# Patient Record
Sex: FEMALE
[De-identification: names, ages, dates, MRNs, and addresses within clinical notes are randomized per-mention and may not be internally consistent; named-entity substitution may affect disease eponyms.]

---

## 2018-06-13 ENCOUNTER — HOSPITAL ENCOUNTER (EMERGENCY)
Dept: HOSPITAL 31 - C.ER | Age: 38
Discharge: HOME | End: 2018-06-13
Payer: MEDICAID

## 2018-06-13 VITALS
RESPIRATION RATE: 18 BRPM | DIASTOLIC BLOOD PRESSURE: 75 MMHG | SYSTOLIC BLOOD PRESSURE: 112 MMHG | OXYGEN SATURATION: 98 % | HEART RATE: 78 BPM | TEMPERATURE: 98.7 F

## 2018-06-13 VITALS — BODY MASS INDEX: 27 KG/M2

## 2018-06-13 DIAGNOSIS — R51: ICD-10-CM

## 2018-06-13 DIAGNOSIS — R06.00: ICD-10-CM

## 2018-06-13 DIAGNOSIS — J45.909: Primary | ICD-10-CM

## 2018-06-13 LAB
ALBUMIN SERPL-MCNC: 4.4 G/DL (ref 3.5–5)
ALBUMIN/GLOB SERPL: 1.3 {RATIO} (ref 1–2.1)
ALT SERPL-CCNC: 33 U/L (ref 9–52)
AST SERPL-CCNC: 27 U/L (ref 14–36)
BASOPHILS # BLD AUTO: 0.1 K/UL (ref 0–0.2)
BASOPHILS NFR BLD: 1 % (ref 0–2)
BILIRUB UR-MCNC: NEGATIVE MG/DL
BUN SERPL-MCNC: 11 MG/DL (ref 7–17)
CALCIUM SERPL-MCNC: 9.4 MG/DL (ref 8.6–10.4)
EOSINOPHIL # BLD AUTO: 0.1 K/UL (ref 0–0.7)
EOSINOPHIL NFR BLD: 2.3 % (ref 0–4)
ERYTHROCYTE [DISTWIDTH] IN BLOOD BY AUTOMATED COUNT: 13 % (ref 11.5–14.5)
GFR NON-AFRICAN AMERICAN: > 60
GLUCOSE UR STRIP-MCNC: NORMAL MG/DL
HCG,QUALITATIVE URINE: NEGATIVE
HGB BLD-MCNC: 13.4 G/DL (ref 11–16)
LEUKOCYTE ESTERASE UR-ACNC: (no result) LEU/UL
LYMPHOCYTES # BLD AUTO: 2.2 K/UL (ref 1–4.3)
LYMPHOCYTES NFR BLD AUTO: 34.5 % (ref 20–40)
MCH RBC QN AUTO: 28.9 PG (ref 27–31)
MCHC RBC AUTO-ENTMCNC: 33.4 G/DL (ref 33–37)
MCV RBC AUTO: 86.5 FL (ref 81–99)
MONOCYTES # BLD: 0.5 K/UL (ref 0–0.8)
MONOCYTES NFR BLD: 7.5 % (ref 0–10)
NEUTROPHILS # BLD: 3.5 K/UL (ref 1.8–7)
NEUTROPHILS NFR BLD AUTO: 54.7 % (ref 50–75)
NRBC BLD AUTO-RTO: 0.1 % (ref 0–2)
PH UR STRIP: 7 [PH] (ref 5–8)
PLATELET # BLD: 243 K/UL (ref 130–400)
PMV BLD AUTO: 9.6 FL (ref 7.2–11.7)
PROT UR STRIP-MCNC: NEGATIVE MG/DL
RBC # BLD AUTO: 4.66 MIL/UL (ref 3.8–5.2)
RBC # UR STRIP: (no result) /UL
SP GR UR STRIP: 1.01 (ref 1–1.03)
SQUAMOUS EPITHIAL: 1 /HPF (ref 0–5)
UROBILINOGEN UR-MCNC: NORMAL MG/DL (ref 0.2–1)
WBC # BLD AUTO: 6.5 K/UL (ref 4.8–10.8)

## 2018-06-13 PROCEDURE — 84484 ASSAY OF TROPONIN QUANT: CPT

## 2018-06-13 PROCEDURE — 85025 COMPLETE CBC W/AUTO DIFF WBC: CPT

## 2018-06-13 PROCEDURE — 93005 ELECTROCARDIOGRAM TRACING: CPT

## 2018-06-13 PROCEDURE — 99284 EMERGENCY DEPT VISIT MOD MDM: CPT

## 2018-06-13 PROCEDURE — 96361 HYDRATE IV INFUSION ADD-ON: CPT

## 2018-06-13 PROCEDURE — 81001 URINALYSIS AUTO W/SCOPE: CPT

## 2018-06-13 PROCEDURE — 71045 X-RAY EXAM CHEST 1 VIEW: CPT

## 2018-06-13 PROCEDURE — 80053 COMPREHEN METABOLIC PANEL: CPT

## 2018-06-13 PROCEDURE — 84703 CHORIONIC GONADOTROPIN ASSAY: CPT

## 2018-06-13 PROCEDURE — 96374 THER/PROPH/DIAG INJ IV PUSH: CPT

## 2018-06-13 PROCEDURE — 96375 TX/PRO/DX INJ NEW DRUG ADDON: CPT

## 2018-06-13 NOTE — C.PDOC
History Of Present Illness





<Yamileth Li - Last Filed: 06/13/18 19:05>





<Mike Garcia - Last Filed: 06/13/18 19:59>


38 y/o female presents to ED with c/o sob, cp, lightheadedness, nausea and 

headache since earlier today. Patient reports photophobia and denies fever, 

trauma, back pain, dysuria, leg pain or swelling. No other complaints at this 

time.  (Yamileth Li)


History Per: Patient


History/Exam Limitations: no limitations


Onset/Duration Of Symptoms: Days


Current Symptoms Are (Timing): Still Present





<Yamileth Li - Last Filed: 06/13/18 19:05>





<Mike Garcia - Last Filed: 06/13/18 19:59>


Time Seen by Provider: 06/13/18 15:54


Chief Complaint (Nursing): Shortness Of Breath





Past Medical History


Reviewed: Historical Data, Nursing Documentation, Vital Signs





- Medical History


PMH: No Chronic Diseases


Surgical History: Cholecystectomy


Family History: States: No Known Family Hx





- Social History


Hx Alcohol Use: No


Hx Substance Use: No





- Immunization History


Hx Tetanus Toxoid Vaccination: No


Hx Influenza Vaccination: No


Hx Pneumococcal Vaccination: No





<Yamileth Li - Last Filed: 06/13/18 19:05>


Vital Signs: 





 Last Vital Signs











Temp  98.7 F   06/13/18 19:46


 


Pulse  78   06/13/18 19:46


 


Resp  18   06/13/18 19:46


 


BP  112/75   06/13/18 19:46


 


Pulse Ox  98   06/13/18 19:46














Review Of Systems


Constitutional: Negative for: Fever, Chills


Cardiovascular: Positive for: Chest Pain, Light Headedness


Respiratory: Positive for: Shortness of Breath


Gastrointestinal: Positive for: Nausea, Abdominal Pain.  Negative for: Vomiting

, Diarrhea


Genitourinary: Negative for: Dysuria


Musculoskeletal: Negative for: Back Pain


Skin: Negative for: Rash





<Yamileth Li - Last Filed: 06/13/18 19:05>





Physical Exam





- Physical Exam


Appears: Non-toxic, No Acute Distress


Skin: Warm, Dry, No Rash


Head: Atraumatic, Normacephalic


Oral Mucosa: Moist


Neck: Normal ROM, Supple


Cardiovascular: Rhythm Regular


Respiratory: Normal Breath Sounds, No Rales, No Rhonchi, No Wheezing


Gastrointestinal/Abdominal: Soft, No Tenderness, No Guarding, No Rebound


Back: No CVA Tenderness, No Paraspinal Tenderness


Neurological/Psych: Oriented x3, Normal Speech, Normal Cognition





<EmanieloYamileth SUSHIL - Last Filed: 06/13/18 19:05>





ED Course And Treatment





- Laboratory Results


Result Diagrams: 


 06/13/18 17:05





 06/13/18 17:05


O2 Sat by Pulse Oximetry: 100 (RA)


Pulse Ox Interpretation: Normal





<JenYamileth C - Last Filed: 06/13/18 19:05>





- Laboratory Results


Result Diagrams: 


 06/13/18 17:05





 06/13/18 17:05


Pulse Ox Interpretation: Normal





- Radiology


CXR: Interpreted by Me, Viewed By Me


CXR Interpretation: No: Infiltrates, Fracture, Pnemothorax


Progress Note: pt feels fine. no more headache or shortness of breath.  

Speaking in complete sentences


Reevaluation Time: 19:52


Reassessment Condition: Improved





<Mike Garcia - Last Filed: 06/13/18 19:59>





Disposition





- Disposition


Disposition Time: 19:06





<JenYamileth C - Last Filed: 06/13/18 19:05>


Counseled Patient/Family Regarding: Studies Performed, Diagnosis, Need For 

Followup, Rx Given





<RadhaEditameño - Last Filed: 06/13/18 19:59>





- Disposition


Referrals: 


Pam Munoz [Medical Doctor] - 


Disposition: HOME/ ROUTINE


Condition: FAIR


Additional Instructions: 


Please return if symptoms recur


Prescriptions: 


Albuterol HFA [Ventolin HFA 90 mcg/actuation (8 g)] 2 puff IH [id number] PRN #1 

puff


 PRN Reason: Shortness Of Breath


Naproxen [Naprosyn] 1 tab PO BID PRN #25 tab


 PRN Reason: Pain


Ondansetron ODT [Zofran ODT] 1 odt PO BID PRN #6 odt


 PRN Reason: Nausea/Vomiting


Instructions:  Headache, Adult (DC)


Forms:  [facility] (English)





- Clinical Impression


Clinical Impression: 


 Headache, Dyspnea, Reactive airway disease








- PA / NP / Resident Statement


MD/DO has reviewed & agrees with the documentation as recorded.





- Scribe Statement


The provider has reviewed the documentation as recorded by the Scribe





<Yamileth Li - Last Filed: 06/13/18 19:05>





<Mike Garcia - Last Filed: 06/13/18 19:59>





- Scribe Statement


Luis Valentin





All medical record entries made by the Scribe were at my direction and 

personally dictated by me. I have reviewed the chart and agree that the record 

accurately reflects my personal performance of the history, physical exam, 

medical decision making, and the department course for this patient. I have 

also personally directed, reviewed, and agree with the discharge instructions 

and disposition. (Yamileth Li)





Physician Patient Turnover


Patient Signed Over To: Mike Garcia


Handoff Comments: Pending re-eval and disposition





<Yamileth Li - Last Filed: 06/13/18 19:05>

## 2018-06-14 NOTE — RAD
Chest x-ray single frontal view 



History: Chest pain. 



Comparison: None available. 



Findings 



No focal infiltrate or effusion. 



Heart size within normal limits. 



Bibasilar breast and nipple shadows. 



Impression: 



No focal infiltrate or effusion.

## 2018-06-14 NOTE — CARD
--------------- APPROVED REPORT --------------





EKG Measurement

Heart [id number]

IN [id number]

[id number]

[id number]

[id number]



<Conclusion>

Normal sinus rhythm

Normal ECG

## 2018-09-02 ENCOUNTER — HOSPITAL ENCOUNTER (INPATIENT)
Dept: HOSPITAL 31 - C.ER | Age: 38
LOS: 3 days | Discharge: HOME | DRG: 143 | End: 2018-09-05
Attending: INTERNAL MEDICINE | Admitting: INTERNAL MEDICINE
Payer: MEDICAID

## 2018-09-02 VITALS — BODY MASS INDEX: 27 KG/M2

## 2018-09-02 DIAGNOSIS — R07.89: Primary | ICD-10-CM

## 2018-09-02 DIAGNOSIS — I65.29: ICD-10-CM

## 2018-09-02 DIAGNOSIS — I10: ICD-10-CM

## 2018-09-02 DIAGNOSIS — J45.909: ICD-10-CM

## 2018-09-02 LAB
ALBUMIN SERPL-MCNC: 4.4 G/DL (ref 3.5–5)
ALBUMIN/GLOB SERPL: 1.4 {RATIO} (ref 1–2.1)
ALT SERPL-CCNC: 33 U/L (ref 9–52)
APTT BLD: 32 SECONDS (ref 21–34)
AST SERPL-CCNC: 24 U/L (ref 14–36)
BASOPHILS # BLD AUTO: 0 K/UL (ref 0–0.2)
BASOPHILS NFR BLD: 0.8 % (ref 0–2)
BILIRUB UR-MCNC: NEGATIVE MG/DL
BNP SERPL-MCNC: 63.3 PG/ML (ref 0–450)
BUN SERPL-MCNC: 13 MG/DL (ref 7–17)
CALCIUM SERPL-MCNC: 9.4 MG/DL (ref 8.6–10.4)
CK MB SERPL-MCNC: 0.34 NG/ML (ref 0–3.38)
EOSINOPHIL # BLD AUTO: 0.1 K/UL (ref 0–0.7)
EOSINOPHIL NFR BLD: 1.9 % (ref 0–4)
ERYTHROCYTE [DISTWIDTH] IN BLOOD BY AUTOMATED COUNT: 13.2 % (ref 11.5–14.5)
GFR NON-AFRICAN AMERICAN: > 60
GLUCOSE UR STRIP-MCNC: NORMAL MG/DL
HCG,QUALITATIVE URINE: NEGATIVE
HGB BLD-MCNC: 13.4 G/DL (ref 11–16)
INR PPP: 1.1
LEUKOCYTE ESTERASE UR-ACNC: (no result) LEU/UL
LYMPHOCYTES # BLD AUTO: 1.8 K/UL (ref 1–4.3)
LYMPHOCYTES NFR BLD AUTO: 32.8 % (ref 20–40)
MCH RBC QN AUTO: 29.2 PG (ref 27–31)
MCHC RBC AUTO-ENTMCNC: 33.8 G/DL (ref 33–37)
MCV RBC AUTO: 86.3 FL (ref 81–99)
MONOCYTES # BLD: 0.4 K/UL (ref 0–0.8)
MONOCYTES NFR BLD: 8.3 % (ref 0–10)
NEUTROPHILS # BLD: 3 K/UL (ref 1.8–7)
NEUTROPHILS NFR BLD AUTO: 56.2 % (ref 50–75)
NRBC BLD AUTO-RTO: 0 % (ref 0–2)
PH UR STRIP: 7 [PH] (ref 5–8)
PLATELET # BLD: 207 K/UL (ref 130–400)
PMV BLD AUTO: 10.3 FL (ref 7.2–11.7)
PROT UR STRIP-MCNC: NEGATIVE MG/DL
PROTHROMBIN TIME: 11.7 SECONDS (ref 9.7–12.2)
RBC # BLD AUTO: 4.61 MIL/UL (ref 3.8–5.2)
RBC # UR STRIP: (no result) /UL
SP GR UR STRIP: 1 (ref 1–1.03)
SQUAMOUS EPITHIAL: 1 /HPF (ref 0–5)
UROBILINOGEN UR-MCNC: NORMAL MG/DL (ref 0.2–1)
WBC # BLD AUTO: 5.4 K/UL (ref 4.8–10.8)

## 2018-09-02 RX ADMIN — ENOXAPARIN SODIUM SCH MG: 40 INJECTION SUBCUTANEOUS at 20:59

## 2018-09-02 NOTE — C.PDOC
History Of Present Illness


38 year old female, hx of htn,  presents to the emergency department with 

complaints of chest pain and oral numbness for the last three days. She states 

that she is having difficulty breathing but denies fever.


Time Seen by Provider: 09/02/18 14:46


Chief Complaint (Nursing): Dizziness/Lightheaded


History Per: Patient


History/Exam Limitations: no limitations


Onset/Duration Of Symptoms: Days (3)


Current Symptoms Are (Timing): Still Present


Quality: "Pain", Other (numbness)





Past Medical History


Reviewed: Historical Data, Nursing Documentation, Vital Signs


Vital Signs: 


 Last Vital Signs











Temp  98.3 F   09/02/18 18:47


 


Pulse  74   09/02/18 18:47


 


Resp  20   09/02/18 18:47


 


BP  129/87   09/02/18 18:47


 


Pulse Ox  99   09/02/18 21:11














- Medical History


PMH: No Chronic Diseases


Surgical History: Cholecystectomy


Family History: States: No Known Family Hx





- Social History


Hx Alcohol Use: No


Hx Substance Use: No





- Immunization History


Hx Tetanus Toxoid Vaccination: No


Hx Influenza Vaccination: No


Hx Pneumococcal Vaccination: No





Review Of Systems


Except As Marked, All Systems Reviewed And Found Negative.


Constitutional: Negative for: Fever


ENT: Positive for: Other (oral numbness)


Cardiovascular: Positive for: Chest Pain


Respiratory: Positive for: Other (dyspnea)





Physical Exam





- Physical Exam


Appears: Non-toxic, No Acute Distress, Other (anxious)


Skin: Warm, Dry


Head: Atraumatic, Normacephalic


Eye(s): bilateral: Normal Inspection


Oral Mucosa: Moist


Tongue: Normal Appearing


Throat: Normal, No Erythema, No Exudate


Neck: Normal, Supple


Chest: Symmetrical


Cardiovascular: Rhythm Regular


Respiratory: Normal Breath Sounds, No Rales, No Rhonchi, No Wheezing


Gastrointestinal/Abdominal: Soft, No Tenderness, No Guarding, No Rebound


Extremity: Normal ROM


Neurological/Psych: Oriented x3, Normal Speech, Normal Cognition





ED Course And Treatment





- Laboratory Results


Result Diagrams: 


 09/02/18 15:10





 09/02/18 15:10


ECG: Interpreted By Me, Viewed By Me


ECG Rhythm: Sinus Rhythm (85)


Interpretation Of ECG: Normal sinus rhythm at 85bpm, no ST/T wave changes.


O2 Sat by Pulse Oximetry: 99 (RA)


Pulse Ox Interpretation: Normal





Medical Decision Making


Medical Decision Making: 


cp r/o acs vs anxiety





Plan:


EKG


BNP


CMP


Troponin I


CBC


PTT


Prothrombin Time


CXR


Ativan


HCG Qualitative Urine


Urinalysis





labs neg, ekg no changes. pt and family at bedside request overnight obs. case 

discussed with dr miller. accepts for obs. 





Disposition





- Disposition


Disposition: HOSPITALIZED


Disposition Time: 06:00


Condition: STABLE





- Clinical Impression


Clinical Impression: 


 Chest pain








- Scribe Statement


The provider has reviewed the documentation as recorded by the Scribe (Pete Kee)


All medical record entries made by the Scribe were at my direction and 

personally dictated by me. I have reviewed the chart and agree that the record 

accurately reflects my personal performance of the history, physical exam, 

medical decision making, and the department course for this patient. I have 

also personally directed, reviewed, and agree with the discharge instructions 

and disposition.

## 2018-09-02 NOTE — RAD
Date of service: 



09/02/2018



PROCEDURE:  CHEST RADIOGRAPH, 1 VIEW



HISTORY:

chest pain



COMPARISON:

Comparison chest 06/13/2018



FINDINGS:



LUNGS:

Poor inspiration with low lung volumes, crowded bronchovascular 

markings and minor bibasilar atelectasis.



PLEURA:

No pneumothorax or pleural fluid seen.



CARDIOVASCULAR:

Normal.



OSSEOUS STRUCTURES:

No significant abnormalities.



VISUALIZED UPPER ABDOMEN:

Normal.



OTHER FINDINGS:

None. 



IMPRESSION:

Poor inspiration with low lung volumes, crowded bronchovascular 

markings and minor bibasilar atelectasis.

## 2018-09-02 NOTE — CP.PCM.HP
History of Present Illness





- History of Present Illness


History of Present Illness: 


38-year-old female with PMH of HTN comes to ED with complaints of chest pain 

and oral numbness since 3 days.  She reports of having difficulty breathing.  

Denies fever, chills, abdominal pain, nausea, vomiting, dizziness, tingling or 

numbness anywhere else








Present on Admission





- Present on Admission


Any Indicators Present on Admission: No





Past Patient History





- Past Social History


Smoking Status: Never Smoked





- GASTROINTESTINAL


Hx Gastrointestinal Disorders: Yes


Other/Comment: Hernia





- PSYCHIATRIC


Hx Substance Use: No





- SURGICAL HISTORY


Hx Cholecystectomy: Yes





- ANESTHESIA


Hx Anesthesia: Yes


Hx Anesthesia Reactions: No





Meds


Home Medications: 


 Home Medication List











 Medication  Instructions  Recorded  Confirmed  Type


 


Clopidogrel [Plavix] 75 mg PO DAILY #30 tab 09/05/18  Rx











Allergies/Adverse Reactions: 


 Allergies











Allergy/AdvReac Type Severity Reaction Status Date / Time


 


codeine Allergy  SHORTNESS Verified 09/02/18 14:33





   OF BREATH  














Physical Exam





- Constitutional


Appears: Well





- Head Exam


Head Exam: ATRAUMATIC, NORMAL INSPECTION, NORMOCEPHALIC





- Eye Exam


Eye Exam: EOMI, Normal appearance, PERRL


Pupil Exam: NORMAL ACCOMODATION, PERRL





- ENT Exam


ENT Exam: Mucous Membranes Moist, Normal Exam





- Neck Exam


Neck exam: Positive for: Normal Inspection





- Respiratory Exam


Respiratory Exam: Decreased Breath Sounds





- Cardiovascular Exam


Cardiovascular Exam: REGULAR RHYTHM, +S1, +S2





- GI/Abdominal Exam


GI & Abdominal Exam: Diminished Bowel Sounds, Soft





- Rectal Exam


Rectal Exam: Deferred





Results





- Vital Signs


Recent Vital Signs: 





 Last Vital Signs











Temp  98.3 F   09/02/18 18:47


 


Pulse  74   09/02/18 18:47


 


Resp  20   09/02/18 18:47


 


BP  129/87   09/02/18 18:47


 


Pulse Ox  99   09/02/18 18:47














- Labs


Result Diagrams: 


 09/05/18 06:38





 09/05/18 06:38


Labs: 





 Laboratory Results - last 24 hr











  09/02/18 09/02/18 09/02/18





  15:10 15:10 15:10


 


WBC  5.4  


 


RBC  4.61  


 


Hgb  13.4  


 


Hct  39.8  


 


MCV  86.3  


 


MCH  29.2  


 


MCHC  33.8  


 


RDW  13.2  


 


Plt Count  207  


 


MPV  10.3  


 


Neut % (Auto)  56.2  


 


Lymph % (Auto)  32.8  


 


Mono % (Auto)  8.3  


 


Eos % (Auto)  1.9  


 


Baso % (Auto)  0.8  


 


Neut # (Auto)  3.0  


 


Lymph # (Auto)  1.8  


 


Mono # (Auto)  0.4  


 


Eos # (Auto)  0.1  


 


Baso # (Auto)  0.0  


 


PT    11.7


 


INR    1.1


 


APTT    32


 


Sodium   141 


 


Potassium   3.8 


 


Chloride   103 


 


Carbon Dioxide   26 


 


Anion Gap   17 


 


BUN   13 


 


Creatinine   0.7 


 


Est GFR ( Amer)   > 60 


 


Est GFR (Non-Af Amer)   > 60 


 


Random Glucose   104 


 


Calcium   9.4 


 


Total Bilirubin   0.6 


 


AST   24 


 


ALT   33 


 


Alkaline Phosphatase   50 


 


Troponin I   < 0.0120 


 


NT-Pro-B Natriuret Pep   63.3 


 


Total Protein   7.4 


 


Albumin   4.4 


 


Globulin   3.0 


 


Albumin/Globulin Ratio   1.4 


 


Urine Color   


 


Urine Clarity   


 


Urine pH   


 


Ur Specific Gravity   


 


Urine Protein   


 


Urine Glucose (UA)   


 


Urine Ketones   


 


Urine Blood   


 


Urine Nitrate   


 


Urine Bilirubin   


 


Urine Urobilinogen   


 


Ur Leukocyte Esterase   


 


Urine WBC (Auto)   


 


Urine RBC (Auto)   


 


Ur Squamous Epith Cells   


 


Urine HCG, Qual   














  09/02/18





  16:08


 


WBC 


 


RBC 


 


Hgb 


 


Hct 


 


MCV 


 


MCH 


 


MCHC 


 


RDW 


 


Plt Count 


 


MPV 


 


Neut % (Auto) 


 


Lymph % (Auto) 


 


Mono % (Auto) 


 


Eos % (Auto) 


 


Baso % (Auto) 


 


Neut # (Auto) 


 


Lymph # (Auto) 


 


Mono # (Auto) 


 


Eos # (Auto) 


 


Baso # (Auto) 


 


PT 


 


INR 


 


APTT 


 


Sodium 


 


Potassium 


 


Chloride 


 


Carbon Dioxide 


 


Anion Gap 


 


BUN 


 


Creatinine 


 


Est GFR ( Amer) 


 


Est GFR (Non-Af Amer) 


 


Random Glucose 


 


Calcium 


 


Total Bilirubin 


 


AST 


 


ALT 


 


Alkaline Phosphatase 


 


Troponin I 


 


NT-Pro-B Natriuret Pep 


 


Total Protein 


 


Albumin 


 


Globulin 


 


Albumin/Globulin Ratio 


 


Urine Color  Straw


 


Urine Clarity  Clear


 


Urine pH  7.0


 


Ur Specific Gravity  1.005


 


Urine Protein  Negative


 


Urine Glucose (UA)  Normal


 


Urine Ketones  Negative


 


Urine Blood  1+ H


 


Urine Nitrate  Negative


 


Urine Bilirubin  Negative


 


Urine Urobilinogen  Normal


 


Ur Leukocyte Esterase  Neg


 


Urine WBC (Auto)  < 1


 


Urine RBC (Auto)  1


 


Ur Squamous Epith Cells  1


 


Urine HCG, Qual  Negative

## 2018-09-03 LAB
CK MB SERPL-MCNC: < 0.22 NG/ML (ref 0–3.38)
CK MB SERPL-MCNC: < 0.22 NG/ML (ref 0–3.38)
HDLC SERPL-MCNC: 50 MG/DL (ref 30–70)
LDLC SERPL-MCNC: 74 MG/DL (ref 0–129)
T4 FREE SERPL-MCNC: 1.24 NG/DL (ref 0.78–2.19)

## 2018-09-03 RX ADMIN — ENOXAPARIN SODIUM SCH MG: 40 INJECTION SUBCUTANEOUS at 09:24

## 2018-09-03 NOTE — CP.PCM.PN
Subjective





- Date & Time of Evaluation


Date of Evaluation: 09/03/18


Time of Evaluation: 10:30





- Subjective


Subjective: 


clinically same





Objective





- Vital Signs/Intake and Output


Vital Signs (last 24 hours): 


 











Temp Pulse Resp BP Pulse Ox


 


 98.3 F   65   20   116/77   98 


 


 09/03/18 15:00  09/03/18 15:00  09/03/18 15:00  09/03/18 15:00  09/03/18 15:00











- Medications


Medications: 


 Current Medications





Albuterol/Ipratropium (Duoneb 3 Mg/0.5 Mg (3 Ml) Ud)  3 ml INH Q6 PRN


   PRN Reason: Shortness of Breath


Aspirin (Aspirin)  81 mg PO DAILY [facility]


   Last Admin: 09/03/18 11:20 Dose:  Not Given


Clopidogrel Bisulfate (Plavix)  75 mg PO DAILY [facility]


   Last Admin: 09/03/18 09:24 Dose:  75 mg


Enoxaparin Sodium (Lovenox)  40 mg SC DAILY [facility]


   Last Admin: 09/03/18 09:24 Dose:  40 mg


Famotidine (Pepcid)  20 mg PO BID [facility]


   Last Admin: 09/03/18 17:49 Dose:  20 mg


Naproxen (Anaprox)  275 mg PO BID PRN


   PRN Reason: pain


Ondansetron HCl (Zofran Tab)  4 mg PO BID PRN


   PRN Reason: Nausea/Vomiting


Pneumococcal Polyvalent Vaccine (Pneumovax 23 Vaccine)  0.5 ml IM .ONCE ONE


   Stop: 09/04/18 10:01











- Labs


Labs: 


 





 09/02/18 15:10 





 09/02/18 15:10 





 











PT  11.7 SECONDS (9.7-12.2)   09/02/18  15:10    


 


INR  1.1   09/02/18  15:10    


 


APTT  32 SECONDS (21-34)   09/02/18  15:10    














- Constitutional


Appears: Well





- Head Exam


Head Exam: ATRAUMATIC, NORMAL INSPECTION, NORMOCEPHALIC





- Eye Exam


Eye Exam: EOMI, Normal appearance, PERRL


Pupil Exam: NORMAL ACCOMODATION, PERRL





- ENT Exam


ENT Exam: Mucous Membranes Moist, Normal Exam





- Neck Exam


Neck Exam: Full ROM, Normal Inspection.  absent: Lymphadenopathy





- Respiratory Exam


Respiratory Exam: Decreased Breath Sounds





- Cardiovascular Exam


Cardiovascular Exam: REGULAR RHYTHM, +S1, +S2





- GI/Abdominal Exam


GI & Abdominal Exam: Soft, Diminished Bowel Sounds





- Rectal Exam


Rectal Exam: Deferred





Assessment and Plan





- Assessment and Plan (Free Text)


Plan: 


Patient seen and examined bedside


Neuro consult done, appreciated


Stroke prophylaxis


MRI of brain


Doppler ultrasound of carotid


Ankle


EKG showing normal rhythm


EEG


Cardiology reference done help appreciated

## 2018-09-03 NOTE — CP.PCM.CON
History of Present Illness





- History of Present Illness


History of Present Illness: 





I was asked to see patient by Dr SPEEDY Arnold.





patient is a 38 year old female who presents with chest pain, then developed 

headache and dizziness.  She also developed tongue numbness.  The symptoms were 

intermittent and progressive occurring at rest.  She also felt weakness in the 

left arm.





Review of Systems





- Constitutional


Constitutional: absent: As Per HPI, Anorexia, Chills, Daytime Sleepiness, 

Excessive Sweating, Fatigue, Fever, Frequent Falls, Headache, Increased Appetite

, Lethargy, Malaise, Night Sweats, Snoring, Sleep Apnea, Weight Gain, Weight 

Loss, Weakness, Other





- EENT


Eyes: absent: As Per HPI, Blind Spots, Blurred Vision, Change in Vision, 

Diplopia, Discharge, Dry Eye, Exophthalmos, Floaters, Irritation, Itchy Eyes, 

Loss of Peripheral Vision, Pain, Photophobia, Requires Corrective Lenses, Sees 

Flashes, Spots in Vision, Tunnel Vision, Other Visual Disturbances, Loss of 

Vision, Other


Ears: absent: As Per HPI, Decreased Hearing, Ear Discharge, Ear Pain, Tinnitus, 

Abnormal Hearing, Disequilibrium, Dizziness, Other


Nose/Mouth/Throat: absent: As Per HPI, Epistaxis, Nasal Congestion, Nasal 

Discharge, Nasal Obstruction, Nasal Trauma, Nose Pain, Post Nasal Drip, Sinus 

Pain, Sinus Pressure, Bleeding Gums, Change in Voice, Dental Pain, Dry Mouth, 

Dysphagia, Halitosis, Hoarsness, Lip Swelling, Mouth Lesions, Mouth Pain, 

Odynophagia, Sore Throat, Throat Swelling, Tongue Swelling, Facial Pain, Neck 

Pain, Neck Mass, Other





- Cardiovascular


Cardiovascular: Chest Pain





- Respiratory


Respiratory: absent: As Per HPI, Cough, Dyspnea, Hemoptysis, Dyspnea on Exertion

, Wheezing, Snoring, Stridor, Pain on Inspiration, Chest Congestion, Excessive 

Mucous Production, Change in Mucous Color, Pain with Coughing, Other





- Gastrointestinal


Gastrointestinal: absent: As Per HPI, Abdominal Pain, Belching, Bloating, 

Change in Bowel Habits, Change in Stool Character, Coffee Ground Emesis, 

Constipation, Cramping, Diarrhea, Dyspepsia, Dysphagia, Early Satiety, 

Excessive Flatus, Fecal Incontinence, Heartburn, Hematemesis, Hematochezia, 

Loose Stools, Melena, Nausea, Odynophagia, Temesmus, Vomiting, Other





- Genitourinary


Genitourinary: absent: As Per HPI, Change in Urinary Stream, Difficulty 

Urinating, Dysuria, Flank Pain, Hematuria, Pyuria, Nocturia, Urinary 

Incontinence, Urinary Frequency, Urinary Hesitance, Urinary Urgency, Voiding 

Freq/Small Amts, Freq UTI, Hx Renal/Bladder Calculi, Hx /Renal Surgery, 

Bladder Distension, Other





- Menstruation


Menstruation: absent: As Per HPI, Amenorrhea, Amenorrhea/Birth Control, 

Currently Menstual, Cycle <21 Days, Cycle >35 Days, Cycle Variable, Menses 1-7 

Days, Menses >/= 8 Days, Menses Variable, Cycle > 4 Weeks Between, No Menses 

for 6 Months, Heavy Menses, Light Menses, Normal Menses, Spotting Between Cycles

, S/P Hysterectomy, Menopausal, Post Menopausal, Premenarche, Abnormal Vaginal 

Bleeding, Dysmenorrhea, Other





- Musculoskeletal


Musculoskeletal: absent: As Per HPI, Abnormal Gait, Arthralgias, Atrophy, Back 

Pain, Deformity, Joint Swelling, Limited Range of Motion, Loss of Height, 

Muscle Cramps, Muscle Weakness, Myalgias, Neck Pain, Numbness, Radiating Pain 

into Limb, Stiffness, Tingling, Other





- Integumentary


Integumentary: absent: As Per HPI, Acne, Alopecia, Bleeding Lesions, Change in 

Hair, Change in Nails, Change in Pigmentation, Changing Lesions, Dry Skin, 

Erythema, Furuncle, Hirsutism, Lesions, New Lesions, Non-Healing Lesions, 

Photosensitivity, Pruritus, Rash, Skin Pain, Skin Ulcer, Sores, Striae, Swelling

, Unusual Bruising, Wounds, Jaundice, Other





- Neurological


Neurological: Disequilibrium, Dizziness, Headaches





- Psychiatric


Psychiatric: absent: As Per HPI, Abnormal Sleep Pattern, Anhedonia, Anxiety, 

Auditory Hallucinations, Behavioral Changes, Change in Appetite, Change in 

Libido, Confusion, Depression, Difficulty Concentrating, Hallucinations, 

Homicidal Ideation, Hopelessness, Irritability, Memory Loss, Mood Swings, Panic 

Attacks, Paranoia, Suicidal Ideation, Visual Hallucinations, Tactile 

Hallucinations, Other





- Endocrine


Endocrine: absent: As Per HPI, Change in Body Appearance, Change in Libido, 

Cold Intolorance, Deepening of Voice, Excessive Sweating, Fatigue, Flushing, 

Heat Intolorance, Increase in Ring/Shoe/Hat Size, Palpitations, Polydipsia, 

Polyphagia, Polyuria, Other





- Hematologic/Lymphatic


Hematologic: absent: As Per HPI, Easy Bleeding, Easy Bruising, Lymphadenopathy, 

Other





Past Patient History





- Past Social History


Smoking Status: Never Smoked





- GASTROINTESTINAL


Hx Gastrointestinal Disorders: Yes


Other/Comment: Hernia





- PSYCHIATRIC


Hx Substance Use: No





- SURGICAL HISTORY


Hx Cholecystectomy: Yes





- ANESTHESIA


Hx Anesthesia: Yes


Hx Anesthesia Reactions: No





Meds


Allergies/Adverse Reactions: 


 Allergies











Allergy/AdvReac Type Severity Reaction Status Date / Time


 


codeine Allergy  SHORTNESS Verified 09/02/18 14:33





   OF BREATH  














- Medications


Medications: 


 Current Medications





Albuterol/Ipratropium (Duoneb 3 Mg/0.5 Mg (3 Ml) Ud)  3 ml INH Q6 PRN


   PRN Reason: Shortness of Breath


Aspirin (Aspirin)  81 mg PO DAILY [facility]


Clopidogrel Bisulfate (Plavix)  75 mg PO DAILY [facility]


   Last Admin: 09/03/18 09:24 Dose:  75 mg


Enoxaparin Sodium (Lovenox)  40 mg SC DAILY [facility]


   Last Admin: 09/03/18 09:24 Dose:  40 mg


Naproxen (Anaprox)  275 mg PO BID PRN


   PRN Reason: pain


Ondansetron HCl (Zofran Tab)  4 mg PO BID PRN


   PRN Reason: Nausea/Vomiting


Pantoprazole Sodium (Protonix Ec Tab)  40 mg PO Q24H [facility]


   Last Admin: 09/03/18 08:39 Dose:  40 mg


Pneumococcal Polyvalent Vaccine (Pneumovax 23 Vaccine)  0.5 ml IM .ONCE ONE


   Stop: 09/04/18 10:01











Physical Exam





- Constitutional


Appears: Non-toxic





- Head Exam


Head Exam: NORMAL INSPECTION





- Eye Exam


Eye Exam: Normal appearance





- ENT Exam


ENT Exam: Mucous Membranes Moist





- Neck Exam


Neck exam: Positive for: Full Rom





- Respiratory Exam


Respiratory Exam: NORMAL BREATHING PATTERN





- Cardiovascular Exam


Cardiovascular Exam: REGULAR RHYTHM





- GI/Abdominal Exam


GI & Abdominal Exam: Normal Bowel Sounds





- Rectal Exam


Rectal Exam: Deferred





- Extremities Exam


Extremities exam: Negative for: pedal edema





- Back Exam


Back exam: NORMAL INSPECTION





- Neurological Exam


Neurological exam: Alert, Oriented x3





- Psychiatric Exam


Psychiatric exam: Normal Affect





- Skin


Skin Exam: Normal Color





Results





- Vital Signs


Recent Vital Signs: 


 Last Vital Signs











Temp  98.1 F   09/03/18 07:00


 


Pulse  68   09/03/18 07:35


 


Resp  20   09/03/18 07:00


 


BP  124/85   09/03/18 07:00


 


Pulse Ox  100   09/03/18 07:00














- Labs


Result Diagrams: 


 09/02/18 15:10





 09/02/18 15:10


Labs: 


 Laboratory Results - last 24 hr











  09/02/18 09/02/18 09/02/18





  15:10 15:10 15:10


 


WBC  5.4  


 


RBC  4.61  


 


Hgb  13.4  


 


Hct  39.8  


 


MCV  86.3  


 


MCH  29.2  


 


MCHC  33.8  


 


RDW  13.2  


 


Plt Count  207  


 


MPV  10.3  


 


Neut % (Auto)  56.2  


 


Lymph % (Auto)  32.8  


 


Mono % (Auto)  8.3  


 


Eos % (Auto)  1.9  


 


Baso % (Auto)  0.8  


 


Neut # (Auto)  3.0  


 


Lymph # (Auto)  1.8  


 


Mono # (Auto)  0.4  


 


Eos # (Auto)  0.1  


 


Baso # (Auto)  0.0  


 


PT    11.7


 


INR    1.1


 


APTT    32


 


Sodium   141 


 


Potassium   3.8 


 


Chloride   103 


 


Carbon Dioxide   26 


 


Anion Gap   17 


 


BUN   13 


 


Creatinine   0.7 


 


Est GFR ( Amer)   > 60 


 


Est GFR (Non-Af Amer)   > 60 


 


Random Glucose   104 


 


Calcium   9.4 


 


Total Bilirubin   0.6 


 


AST   24 


 


ALT   33 


 


Alkaline Phosphatase   50 


 


Total Creatine Kinase   


 


CK-MB (Mass)   


 


Troponin I   < 0.0120 


 


NT-Pro-B Natriuret Pep   63.3 


 


Total Protein   7.4 


 


Albumin   4.4 


 


Globulin   3.0 


 


Albumin/Globulin Ratio   1.4 


 


Urine Color   


 


Urine Clarity   


 


Urine pH   


 


Ur Specific Gravity   


 


Urine Protein   


 


Urine Glucose (UA)   


 


Urine Ketones   


 


Urine Blood   


 


Urine Nitrate   


 


Urine Bilirubin   


 


Urine Urobilinogen   


 


Ur Leukocyte Esterase   


 


Urine WBC (Auto)   


 


Urine RBC (Auto)   


 


Ur Squamous Epith Cells   


 


Urine HCG, Qual   














  09/02/18 09/02/18 09/03/18





  16:08 23:11 07:50


 


WBC   


 


RBC   


 


Hgb   


 


Hct   


 


MCV   


 


MCH   


 


MCHC   


 


RDW   


 


Plt Count   


 


MPV   


 


Neut % (Auto)   


 


Lymph % (Auto)   


 


Mono % (Auto)   


 


Eos % (Auto)   


 


Baso % (Auto)   


 


Neut # (Auto)   


 


Lymph # (Auto)   


 


Mono # (Auto)   


 


Eos # (Auto)   


 


Baso # (Auto)   


 


PT   


 


INR   


 


APTT   


 


Sodium   


 


Potassium   


 


Chloride   


 


Carbon Dioxide   


 


Anion Gap   


 


BUN   


 


Creatinine   


 


Est GFR ( Amer)   


 


Est GFR (Non-Af Amer)   


 


Random Glucose   


 


Calcium   


 


Total Bilirubin   


 


AST   


 


ALT   


 


Alkaline Phosphatase   


 


Total Creatine Kinase   97  84


 


CK-MB (Mass)   0.34  < 0.22


 


Troponin I   < 0.0120  < 0.0120


 


NT-Pro-B Natriuret Pep   


 


Total Protein   


 


Albumin   


 


Globulin   


 


Albumin/Globulin Ratio   


 


Urine Color  Straw  


 


Urine Clarity  Clear  


 


Urine pH  7.0  


 


Ur Specific Gravity  1.005  


 


Urine Protein  Negative  


 


Urine Glucose (UA)  Normal  


 


Urine Ketones  Negative  


 


Urine Blood  1+ H  


 


Urine Nitrate  Negative  


 


Urine Bilirubin  Negative  


 


Urine Urobilinogen  Normal  


 


Ur Leukocyte Esterase  Neg  


 


Urine WBC (Auto)  < 1  


 


Urine RBC (Auto)  1  


 


Ur Squamous Epith Cells  1  


 


Urine HCG, Qual  Negative  














- EKG Data


EKG Interpreted by: Myself


EKG shows normal: Sinus rhythm





Assessment & Plan


(1) Chest pain


Assessment and Plan: 


recommend echocardiogram with bubble study.  EKG is normal sinus rhythm.  

decrease ASA to 81 mg daily


Status: Acute

## 2018-09-03 NOTE — CP.PCM.CON
History of Present Illness





- History of Present Illness


History of Present Illness: 





CONSULTATION DICTATED 


TRANSIENT RIGHT SUB CORTICAL DYSFUNCTION 


STROKE PROPHYLAXIS


MRI/CAROTID/ECHO/EEG 








Past Patient History





- Past Social History


Smoking Status: Never Smoked





- GASTROINTESTINAL


Hx Gastrointestinal Disorders: Yes


Other/Comment: Hernia





- PSYCHIATRIC


Hx Substance Use: No





- SURGICAL HISTORY


Hx Cholecystectomy: Yes





- ANESTHESIA


Hx Anesthesia: Yes


Hx Anesthesia Reactions: No





Meds


Allergies/Adverse Reactions: 


 Allergies











Allergy/AdvReac Type Severity Reaction Status Date / Time


 


codeine Allergy  SHORTNESS Verified 09/02/18 14:33





   OF BREATH  














- Medications


Medications: 


 Current Medications





Albuterol/Ipratropium (Duoneb 3 Mg/0.5 Mg (3 Ml) Ud)  3 ml INH Q6 PRN


   PRN Reason: Shortness of Breath


Aspirin (Aspirin)  81 mg PO DAILY [facility]


   Last Admin: 09/03/18 11:20 Dose:  Not Given


Clopidogrel Bisulfate (Plavix)  75 mg PO DAILY [facility]


   Last Admin: 09/03/18 09:24 Dose:  75 mg


Enoxaparin Sodium (Lovenox)  40 mg SC DAILY [facility]


   Last Admin: 09/03/18 09:24 Dose:  40 mg


Naproxen (Anaprox)  275 mg PO BID PRN


   PRN Reason: pain


Ondansetron HCl (Zofran Tab)  4 mg PO BID PRN


   PRN Reason: Nausea/Vomiting


Pneumococcal Polyvalent Vaccine (Pneumovax 23 Vaccine)  0.5 ml IM .ONCE ONE


   Stop: 09/04/18 10:01











Results





- Vital Signs


Recent Vital Signs: 


 Last Vital Signs











Temp  98.3 F   09/03/18 15:00


 


Pulse  65   09/03/18 15:00


 


Resp  20   09/03/18 15:00


 


BP  116/77   09/03/18 15:00


 


Pulse Ox  98   09/03/18 15:00














- Labs


Result Diagrams: 


 09/02/18 15:10





 09/02/18 15:10


Labs: 


 Laboratory Results - last 24 hr











  09/02/18 09/03/18 09/03/18





  23:11 07:50 14:37


 


ESR   


 


Hemoglobin A1c   


 


Total Creatine Kinase  97  84  89


 


CK-MB (Mass)  0.34  < 0.22  < 0.22


 


Troponin I  < 0.0120  < 0.0120  < 0.0120


 


Homocysteine   6.9 


 


Free T4   


 


TSH 3rd Generation   














  09/03/18 09/03/18 09/03/18





  14:37 14:37 14:37


 


ESR  4  


 


Hemoglobin A1c   4.9 


 


Total Creatine Kinase   


 


CK-MB (Mass)   


 


Troponin I   


 


Homocysteine   


 


Free T4    1.24


 


TSH 3rd Generation    0.98

## 2018-09-04 VITALS — RESPIRATION RATE: 20 BRPM

## 2018-09-04 RX ADMIN — ENOXAPARIN SODIUM SCH MG: 40 INJECTION SUBCUTANEOUS at 09:46

## 2018-09-04 NOTE — CP.PCM.PN
Subjective





- Date & Time of Evaluation


Date of Evaluation: 09/04/18


Time of Evaluation: 09:00





- Subjective


Subjective: 





Progress Note for Dr. Aurelio Arnold's Service





This is a 38 year old female with no PMHx who presented with chest pain, oral 

numbness, and left arm weakness. Patient was seen and examined at bedside. 

Patient reports all her symptoms resolved. No acute complaints at this time. 

Denied fever, chills, headache, chest pain, shortness of breath, abdominal pain

, n/v/d/c, or urinary symptoms. Denied any oral numbness. 





Objective





- Vital Signs/Intake and Output


Vital Signs (last 24 hours): 


 











Temp Pulse Resp BP Pulse Ox


 


 98.2 F   67   18   114/79   99 


 


 09/04/18 07:25  09/04/18 07:31  09/04/18 07:25  09/04/18 07:25  09/04/18 07:25











- Medications


Medications: 


 Current Medications





Albuterol/Ipratropium (Duoneb 3 Mg/0.5 Mg (3 Ml) Ud)  3 ml INH Q6 PRN


   PRN Reason: Shortness of Breath


Aspirin (Aspirin Chewable)  81 mg PO DAILY [facility]


   Last Admin: 09/04/18 11:00 Dose:  Not Given


Clopidogrel Bisulfate (Plavix)  75 mg PO DAILY [facility]


   Last Admin: 09/04/18 09:41 Dose:  75 mg


Enoxaparin Sodium (Lovenox)  40 mg SC DAILY [facility]


   Last Admin: 09/04/18 09:46 Dose:  40 mg


Famotidine (Pepcid)  20 mg PO BID [facility]


   Last Admin: 09/04/18 09:41 Dose:  20 mg


Naproxen (Anaprox)  275 mg PO BID PRN


   PRN Reason: pain


Ondansetron HCl (Zofran Tab)  4 mg PO BID PRN


   PRN Reason: Nausea/Vomiting











- Labs


Labs: 


 





 09/02/18 15:10 





 09/02/18 15:10 





 











PT  11.7 SECONDS (9.7-12.2)   09/02/18  15:10    


 


INR  1.1   09/02/18  15:10    


 


APTT  32 SECONDS (21-34)   09/02/18  15:10    














- Constitutional


Appears: No Acute Distress





- Head Exam


Head Exam: NORMAL INSPECTION, NORMOCEPHALIC





- Eye Exam


Eye Exam: EOMI, Normal appearance, PERRL


Pupil Exam: NORMAL ACCOMODATION





- ENT Exam


ENT Exam: Mucous Membranes Moist, Normal Exam





- Respiratory Exam


Respiratory Exam: Clear to Ausculation Bilateral, NORMAL BREATHING PATTERN





- Cardiovascular Exam


Cardiovascular Exam: REGULAR RHYTHM





- GI/Abdominal Exam


GI & Abdominal Exam: Soft, Normal Bowel Sounds.  absent: Distended, Tenderness





- Extremities Exam


Extremities Exam: Normal Inspection.  absent: Pedal Edema, Tenderness





- Neurological Exam


Neurological Exam: Alert, Awake, Oriented x3


Neuro motor strength exam: Left Upper Extremity: 5, Right Upper Extremity: 5, 

Left Lower Extremity: 5, Right Lower Extremity: 5





- Psychiatric Exam


Psychiatric exam: Normal Affect, Normal Mood





- Skin


Skin Exam: Dry, Intact, Normal Color, Warm





Assessment and Plan





- Assessment and Plan (Free Text)


Plan: 





Oral numbness 


TIA? Transient Right Subcortical Dysfunction?


--Neurology consulted Dr. Bates - help appreciated 





Imaging: All results pending 


- Brain MRI


- ECHO with bubble study


- Carotid Dopplers


- EEG 





Labs:


- All WNL





Medications:


- ASA, Plavix, Crestor as stroke prophylaxis 





Chest pain R/O ACS


-- Cardiology consulted - Dr. Moeller help appreciated 





Management:


- EKGs, ROMIs - normal, negative to date 


- ECHO- pending 





Prophylactic Measures:


- GI PPX: Pepcid 


- DVT PPX: SCDs, Lovenox 


- PT Eval 





Disposition: Pending workup - brain MRI, ECHO, carotid dopplers - PT eval. If 

normal, will discharge patient with instructions to follow up with Dr. Bates as 

outpatient. 





All medical management as per Dr. Aurelio Arnold.





DW Dr. Aurelio Arnold,


Dee Dee Fonseca DO, PGY2

## 2018-09-04 NOTE — CARD
--------------- APPROVED REPORT --------------





Date of service: 09/02/2018



EKG Measurement

Heart [id number]

IA [id number]

[id number]

[id number]

[id number]



<Conclusion>

Normal sinus rhythm

Normal ECG

## 2018-09-04 NOTE — PN
Copied To:  Zhao Bates MD

Attending MD:  Zhao Bates MD



DATE:  09/04/2018



TIME OF EVALUATION:  06:55 a.m.



NEUROLOGICAL PROBLEM:  Transient right subcortical dysfunction, which is

resolved at present.



PHYSICAL EXAMINATION:

VITAL SIGNS:  Blood pressure 114/77, mean arterial pressure of 89,

respiratory rate 18, temperature 98.2 with the pulse rate of 65 and

regular.



The patient slept good.  No new symptoms at all.  Whatever the symptoms

that she had at the time of admission all gone.  Chest pain is also gone. 

She feels normal.  She slept good.



Her examination, which is unchanged compared with my previous examination.



WORKUP:  Triglyceride 93, cholesterol 148, LDL 74, HDL 50.



The patient is scheduled to have MRI of the brain, carotid Doppler,

echocardiogram, and EEG.  All following workup the patient is stable, the

patient can be discharged and she will have followup visit as an

outpatient.  Continue aspirin for now for stroke prophylaxis.







__________________________________________

Zhao Bates MD



DD:  09/04/2018 7:10:09

DT:  09/04/2018 7:46:23

Job # [id number]

## 2018-09-04 NOTE — CP.PCM.PN
Subjective





- Date & Time of Evaluation


Date of Evaluation: 09/04/18


Time of Evaluation: 19:40





- Subjective


Subjective: 





echocardiogram reviewed.  normal left ventricular function and no chamber 

enlargement.


recommend continued neuro work up





Objective





- Vital Signs/Intake and Output


Vital Signs (last 24 hours): 


 











Temp Pulse Resp BP Pulse Ox


 


 98.3 F   84   20   117/76   99 


 


 09/04/18 15:02  09/04/18 15:02  09/04/18 15:02  09/04/18 15:02  09/04/18 15:02











- Medications


Medications: 


 Current Medications





Albuterol/Ipratropium (Duoneb 3 Mg/0.5 Mg (3 Ml) Ud)  3 ml INH Q6 PRN


   PRN Reason: Shortness of Breath


Aspirin (Aspirin Chewable)  81 mg PO DAILY [facility]


   Last Admin: 09/04/18 13:16 Dose:  81 mg


Clopidogrel Bisulfate (Plavix)  75 mg PO DAILY [facility]


   Last Admin: 09/04/18 09:41 Dose:  75 mg


Enoxaparin Sodium (Lovenox)  40 mg SC DAILY [facility]


   Last Admin: 09/04/18 09:46 Dose:  40 mg


Famotidine (Pepcid)  20 mg PO BID [facility]


   Last Admin: 09/04/18 17:24 Dose:  20 mg


Lorazepam (Ativan)  1 mg IVP ONCE PRN


   PRN Reason: Anxiety


Naproxen (Anaprox)  275 mg PO BID PRN


   PRN Reason: pain


Ondansetron HCl (Zofran Tab)  4 mg PO BID PRN


   PRN Reason: Nausea/Vomiting


Rosuvastatin Calcium (Crestor)  2.5 mg PO HS [facility]











- Labs


Labs: 


 





 09/02/18 15:10 





 09/02/18 15:10 





 











PT  11.7 SECONDS (9.7-12.2)   09/02/18  15:10    


 


INR  1.1   09/02/18  15:10    


 


APTT  32 SECONDS (21-34)   09/02/18  15:10    














Assessment and Plan


(1) Chest pain


Status: Acute

## 2018-09-04 NOTE — CON
Copied To:  Zhao Bates MD

Attending MD:  Zhao Bates MD



DATE:  09/03/2018



LOCATION:  Room #661, bed B.



ATTENDING PHYSICIAN:  Joe Arnold MD.



REASON FOR CONSULTATION:  Numbness of the tongue and left arm.



CHIEF COMPLAINT:  The patient was brought into [facility] after the

episode of abrupt onset of chest pain following with tongue numbness,

slurred speech, and left-sided numbness.  This happened around 12:30

midday, and the patient was transferred to the [facility] for further

evaluation.



The whole symptom was resolved in a minute or so.



HISTORY OF PRESENTING ILLNESS:  Ms. Miladis Christian is a 38-year-old

right-handed  female, usual state of health, around midday where

she was at home, abrupt onset of chest pain following with tongue numbness

and left sided face, neck, arm, and leg numbness.  She may recall her leg

is also weak at that time.  The whole episode lasted for about an hour or

so; however, her chest pain still persisted.  This episode not associating

with visual dysfunction.  No similar episodes happened in the past.  No

episode associating with these symptoms, such as tremor or seizures.



PAST MEDICAL HISTORY:  Asthma.



PERSONAL HISTORY:  Denies smoking or alcohol use.



ALLERGIES:  ALLERGIC TO CODEINE.



REVIEW OF SYSTEMS:  A 12-point system being reviewed.  From neuro, new

onset of left-sided numbness.



MEDICATIONS:  Naproxen, aspirin, Lovenox, Pepcid, Plavix, and Zofran.



PHYSICAL EXAMINATION:

VITAL SIGNS:  Blood pressure 116/77, mean arterial pressure of 90,

respiratory rate 18, temperature 98.3, and pulse rate 65 and regular.

NECK:  Supple.  No carotid bruits.

HEART:  Sounds regular.

CHEST:  Fair air entry.

EXTREMITIES:  No edema in legs.

NEUROLOGICAL:  Mental status examination, she is awake, alert, and oriented

to person, place, and time.  Speech is clear.  Naming, repetition, fluency,

and comprehension all within normal.  Cranial nerve examination, visual

field intact.  Pupils reactive to light.  Extraocular movement normal.  No

nystagmus.  No facial sensory deficit.  No facial asymmetry.  Hearing is

normal.  Tongue is midline.  Good gag.  Motor examination on outstretched

hand with eyes closed, no drift noted.  Power is symmetric on either side. 

Deep tendon reflexes, biceps, brachialis, triceps 1+; both knees are

absent; both the ankles are absent.  Plantars are downgoing.  Sensory

examination, pain and temperature decreased on the left side. 

Coordination, finger-nose-finger test is intact.  Gait is normal.



WORKUP:  Blood workup; WBC 5.4, hemoglobin 13.4, hematocrit 39.8, and

platelet 207.  PT 11.7, INR 1.1, PTT 32, sodium 141, potassium 3.8,

chloride 103, bicarbonate 26, BUN 13, GFR more than 60, hemoglobin A1c 4.9,

TSH is 0.98.  Urine shows 1+ blood.



CONCLUSION:  Ms. Miladis Christian has been presenting with new onset of

chest pain, followed with slurred speech and left-sided numbness consistent

with right subcortical dysfunction, probably thalamic region.



RECOMMENDATIONS:

1.  MRI of the brain to rule out any structural pathology to explain her

problem.

2.  Carotid Doppler to assess stenosis.

3.  Echocardiogram to assess the cardiac status for her stroke process.

4.  EEG to rule out any electrographic seizures.

5.  The patient should be on antiplatelet and lipid profile to be done and

statin should be placed for stroke prophylaxis.  The patient will be

followed closely with you.





__________________________________________

Zhao Bates MD





DD:  09/03/2018 17:42:29

DT:  09/03/2018 23:09:59

Job # [id number]

## 2018-09-05 VITALS — DIASTOLIC BLOOD PRESSURE: 75 MMHG | OXYGEN SATURATION: 98 % | TEMPERATURE: 97.9 F | SYSTOLIC BLOOD PRESSURE: 108 MMHG

## 2018-09-05 VITALS — HEART RATE: 95 BPM

## 2018-09-05 LAB
ALBUMIN SERPL-MCNC: 4 G/DL (ref 3.5–5)
ALBUMIN/GLOB SERPL: 1.4 {RATIO} (ref 1–2.1)
ALT SERPL-CCNC: 30 U/L (ref 9–52)
AST SERPL-CCNC: 19 U/L (ref 14–36)
BASOPHILS # BLD AUTO: 0 K/UL (ref 0–0.2)
BASOPHILS NFR BLD: 0.6 % (ref 0–2)
BUN SERPL-MCNC: 16 MG/DL (ref 7–17)
CALCIUM SERPL-MCNC: 9.4 MG/DL (ref 8.6–10.4)
EOSINOPHIL # BLD AUTO: 0.2 K/UL (ref 0–0.7)
EOSINOPHIL NFR BLD: 2.3 % (ref 0–4)
ERYTHROCYTE [DISTWIDTH] IN BLOOD BY AUTOMATED COUNT: 13.1 % (ref 11.5–14.5)
GFR NON-AFRICAN AMERICAN: > 60
HGB BLD-MCNC: 13.5 G/DL (ref 11–16)
LYMPHOCYTES # BLD AUTO: 2 K/UL (ref 1–4.3)
LYMPHOCYTES NFR BLD AUTO: 27.7 % (ref 20–40)
MCH RBC QN AUTO: 28.9 PG (ref 27–31)
MCHC RBC AUTO-ENTMCNC: 33.6 G/DL (ref 33–37)
MCV RBC AUTO: 86 FL (ref 81–99)
MONOCYTES # BLD: 0.7 K/UL (ref 0–0.8)
MONOCYTES NFR BLD: 9.9 % (ref 0–10)
NEUTROPHILS # BLD: 4.3 K/UL (ref 1.8–7)
NEUTROPHILS NFR BLD AUTO: 59.5 % (ref 50–75)
NRBC BLD AUTO-RTO: 0 % (ref 0–2)
PLATELET # BLD: 211 K/UL (ref 130–400)
PMV BLD AUTO: 10 FL (ref 7.2–11.7)
RBC # BLD AUTO: 4.67 MIL/UL (ref 3.8–5.2)
WBC # BLD AUTO: 7.2 K/UL (ref 4.8–10.8)

## 2018-09-05 RX ADMIN — ENOXAPARIN SODIUM SCH MG: 40 INJECTION SUBCUTANEOUS at 10:37

## 2018-09-05 NOTE — CARD
--------------- APPROVED REPORT --------------





Date of service: 09/04/2018



EXAM: Two-dimensional and M-mode echocardiogram with Doppler and 

color Doppler with bubble study



Other Information 

Quality : GoodRhythm : 



INDICATION

CVA/TIA Dizziness and Vertigo Chest Pain 



2D DIMENSIONS 

IVSd0.8   (0.7-1.1cm)Aortic Root (2D)2.8   (2.0-3.7cm)

LVDd4.4   (3.9-5.9cm)PWd0.7   (0.7-1.1cm)

LVDs2.6   (2.5-4.0cm)FS (%) 39.3   %

LVEF (%)70.1   (>50%)



M-Mode DIMENSIONS 

RVDd1.97   (2.1-3.2cm)Left Atrium (MM)3.35   (2.5-4.0cm)

IVSd0.90   (0.7-1.1cm)Aortic Root2.47   (2.2-3.7cm)

LVDd4.51   (4.0-5.6cm)Aortic Cusp Exc.1.65   (1.5-2.0cm)

PWd0.79   (0.7-1.1cm)FS (%) 30   %

LVDs3.17   (2.0-3.8cm)LVEF (%)65   (>50%)



Mitral Valve

MV E Wgfroqdt74.1cm/sMV A Somqcguq09.9cm/sE/A ratio0.9



TDI

E/Lateral E'0.0E/Medial E'0.0



Tricuspid Valve

TR Peak Vraxcpfy307pq/sTR Peak Gr.[id number]



<Conclusion>

Left ventricle: thickness: normal; size: normal; overall ejection 

fraction: 65%: 

diastolic filling pressures: normal



Mitral valve: annulus: normal: leaflets: normal: excursion: normal; 

no significant trans-mitral gradient: no significant incompetence: 

left atrium: normal

Aortic valve: leaflets:mild thickening;: excursion: normal; no 

significant trans-aortic gradient: No significant incompetence: 

aortic root: normal

Right sided Structures: Pulmonary valve: normal; no significant 

incompetence; Tricuspid valve: normal; no significant incompetence:

Intra-cardiac hemodynamics: pulmonary systolic pressures: normal; 

central venous pressures: normal

No pericardial effusion

## 2018-09-05 NOTE — CP.PCM.PN
Subjective





- Date & Time of Evaluation


Date of Evaluation: 09/05/18


Time of Evaluation: 09:00





- Subjective


Subjective: 





Progress Note for Dr. Aurelio Arnold's Service





Patient was seen and examined at bedside. Patient reports all her symptoms 

resolved. No acute complaints at this time. Denied fever, chills, headache, 

chest pain, shortness of breath, abdominal pain, n/v/d/c, or urinary symptoms. 

Denied any oral numbness. 





Objective





- Vital Signs/Intake and Output


Vital Signs (last 24 hours): 


 











Temp Pulse Resp BP Pulse Ox


 


 97.9 F   63   20   108/75   98 


 


 09/05/18 07:00  09/05/18 08:00  09/05/18 07:00  09/05/18 07:00  09/05/18 07:00








Intake and Output: 


 











 09/05/18 09/05/18





 06:59 18:59


 


Intake Total 360 


 


Balance 360 














- Medications


Medications: 


 Current Medications





Albuterol/Ipratropium (Duoneb 3 Mg/0.5 Mg (3 Ml) Ud)  3 ml INH Q6 PRN


   PRN Reason: Shortness of Breath


Aspirin (Aspirin Chewable)  81 mg PO DAILY [facility]


   Last Admin: 09/04/18 13:16 Dose:  81 mg


Clopidogrel Bisulfate (Plavix)  75 mg PO DAILY [facility]


   Last Admin: 09/04/18 09:41 Dose:  75 mg


Enoxaparin Sodium (Lovenox)  40 mg SC DAILY [facility]


   Last Admin: 09/04/18 09:46 Dose:  40 mg


Famotidine (Pepcid)  20 mg PO BID [facility]


   Last Admin: 09/04/18 17:24 Dose:  20 mg


Lorazepam (Ativan)  1 mg IVP ONCE PRN


   PRN Reason: Anxiety


   Last Admin: 09/05/18 08:14 Dose:  1 mg


Naproxen (Anaprox)  275 mg PO BID PRN


   PRN Reason: pain


Ondansetron HCl (Zofran Tab)  4 mg PO BID PRN


   PRN Reason: Nausea/Vomiting


Rosuvastatin Calcium (Crestor)  2.5 mg PO [facility]


   Last Admin: 09/04/18 21:32 Dose:  2.5 mg











- Labs


Labs: 


 





 09/05/18 06:38 





 09/05/18 06:38 





 











PT  11.7 SECONDS (9.7-12.2)   09/02/18  15:10    


 


INR  1.1   09/02/18  15:10    


 


APTT  32 SECONDS (21-34)   09/02/18  15:10    














- Additional Findings


Additional findings: 





- Constitutional


Appears: No Acute Distress





- Head Exam


Head Exam: NORMAL INSPECTION, NORMOCEPHALIC





- Eye Exam


Eye Exam: EOMI, Normal appearance, PERRL


Pupil Exam: NORMAL ACCOMODATION





- ENT Exam


ENT Exam: Mucous Membranes Moist, Normal Exam





- Respiratory Exam


Respiratory Exam: Clear to Ausculation Bilateral, NORMAL BREATHING PATTERN





- Cardiovascular Exam


Cardiovascular Exam: REGULAR RHYTHM





- GI/Abdominal Exam


GI & Abdominal Exam: Soft, Normal Bowel Sounds.  absent: Distended, Tenderness





- Extremities Exam


Extremities Exam: Normal Inspection.  absent: Pedal Edema, Tenderness





- Neurological Exam


Neurological Exam: Alert, Awake, Oriented x3


Neuro motor strength exam: Left Upper Extremity: 5, Right Upper Extremity: 5, 

Left Lower Extremity: 5, Right Lower Extremity: 5





- Psychiatric Exam


Psychiatric exam: Normal Affect, Normal Mood





- Skin


Skin Exam: Dry, Intact, Normal Color, Warm





Assessment and Plan





- Assessment and Plan (Free Text)


Plan: 





Oral numbness 


TIA? Transient Right Subcortical Dysfunction?


--Neurology consulted Dr. Bates - help appreciated 





Imaging: 


- Brain MRI - 1.1 x 0.6 cm nonspecific intra diploic lesion in the left 

parietal calvarium.  Correlation with CT scan may be helpful for further 

evaluation.


- EEG - pending 


- ECHO with bubble study - read as normal by Dr. Moeller 


- Carotid Dopplers - negative for any findings 





Labs:


- All WNL





Medications:


- ASA, Plavix, Crestor as stroke prophylaxis 


- Will not be DC with statin since LDL is <100, only plavix as per Dr. Bates. 





Chest pain R/O ACS


-- Cardiology consulted - Dr. Moeller help appreciated 





Management:


- EKGs, ROMIs - normal, negative to date 


- ECHO with bubble study - read as normal by Dr. Moeller 





Prophylactic Measures:


- GI PPX: Pepcid 


- DVT PPX: SCDs, Lovenox 


- PT Eval - home 





Disposition: Patient is stable for discharge. Please continue taking the 

following medications: Plavix 75 mg by mouth daily. Please follow up with your 

primary care physician in 1-2 weeks for follow up care. Please follow up with 

Dr. Bates (Neurology) in 1-2 weeks for follow up care. He will determine how 

long you will need to be on Plavix for. 





All medical management as per Dr. Aurelio Arnold.





DW Dr. Aurelio Arnold,


Dee Dee Fonseca DO, PGY2

## 2018-09-05 NOTE — VASCLAB
Date of service: 



09/04/2018



PROCEDURE:  



HISTORY:

Left sided numbness, slurred speech, dizziness.



COMPARISON:

None available. 



TECHNIQUE:

Grayscale and duplex Doppler evaluation of the cervical carotid and 

vertebral arteries were performed. The common carotid, carotid 

bifurcations and cervical Internal Carotid Artery (ICA) and proximal 

External Carotid Artery (ECA) were evaluated.  The vertebral arteries 

were evaluated for gross patency and flow direction. 



Report prepared by  KARISSA Squires



FINDINGS:



RIGHT CAROTID ARTERIES:

1. Common Carotid Artery: No significant focal plaque formation of 

the right common carotid artery. Maximum Peak Systolic velocity: 79 

cm/sec: End-diastolic velocity 26 cm/sec.



2. Carotid Bifurcation:  plaque formation. Maximum Peak Systolic 

velocity: 49 cm/sec: End-diastolic velocity 23 cm/sec.  



3. Internal Carotid Artery:    Plaque description:   



3.1. Proximal Segment: Peak systolic velocity 62 cm/sec: 

End-diastolic velocity  24 cm/sec - % stenosis  0-15%



3.2. Middle Segment:    Peak systolic velocity 82 cm/sec: 

End-diastolic velocity  46  cm/sec - % stenosis 0-15%



3.3. Distal Segment:     Peak systolic velocity 121 cm/sec: 

End-diastolic velocity  54  cm/sec - % stenosis 0-15%



4. External Carotid Artery: No significant focal plaque formation. 

Peak systolic velocity 56 cm/sec



5. ICA/CCA Ratio: 1.8



LEFT CAROTID ARTERIES:

1. Common Carotid Artery: No significant focal plaque formation of 

the left common carotid artery. Maximum Peak Systolic velocity: 81 

cm/sec: End-diastolic velocity 24 cm/sec.



2. Carotid Bifurcation:  plaque formation. Maximum Peak Systolic 

velocity: 52 cm/sec: End-diastolic velocity 22 cm/sec.



3. Internal Carotid Artery:      Plaque description:  



3.1. Proximal Segment: Peak systolic velocity 68 cm/sec: 

End-diastolic velocity 30 cm/sec - % stenosis 0-15%



3.2. Middle Segment:    Peak systolic velocity 62 cm/sec: 

End-diastolic velocity 34 cm/sec - % stenosis 0-15%



3.3. Distal Segment:     Peak systolic velocity 80 cm/sec: 

End-diastolic velocity 32 cm/sec - % stenosis 0-15%



4. External Carotid Artery: No significant focal plaque formation. 

Peak systolic velocity 62 cm/sec



5. ICA/CCA Ratio: 1.2



VERTEBRAL ARTERIES:

1. Right Vertebral Artery: The right vertebral artery flow direction 

is  antegrade.



2. Left Vertebral Artery:   The left vertebral artery flow direction 

is antegrade.



OTHER FINDINGS:

1. Right Brachial Blood pressure: 110/80 mmHg.



2. Left Brachial Blood pressure: 110/70 mmHg.



IMPRESSION:

RIGHT:  Duplex scan does not suggest hemodynamically significant 

stenosis of the right extracranial carotid arteries.  



LEFT:  Duplex scan does not suggest hemodynamically significant 

stenosis of the left extracranial carotid arteries.

## 2018-09-05 NOTE — PN
Copied To:  Zhao Bates MD

Attending MD:  Zhao Bates MD



DATE:  09/05/2018



TIME OF EVALUATION:  07:15 a.m.



NEUROLOGICAL PROBLEM:  Transient ischemic attack affecting right cortical

region.



PHYSICAL EXAMINATION:

GENERAL:  The patient awake, alert, oriented to person, place and time.

VITAL SIGNS:  Blood pressure 112/78, mean arterial pressure of 89,

respiratory rate 18, pulse rate is 79 and regular, temperature 98.3.



Symptoms all gone.  Examination which is unchanged to compare with my

previous examination.



The patient was trying to get MRI of the brain because of claustrophobia,

which is not done.  The patient is asymptomatic at present.



RECOMMENDATIONS:

1.  Stroke prophylaxis.

2.  Medically stable.  The patient can be discharge and MRI of the brain

can be done as an outpatient in the open MRI setting.



The patient is advised to see myself in followup visit for her neurological

problem.







__________________________________________

Zhao Bates MD



DD:  09/05/2018 7:23:46

DT:  09/05/2018 8:06:15

Job # [id number]

## 2018-09-05 NOTE — MRI
Date of service: 



09/05/2018



PROCEDURE:  MRI BRAIN WITHOUT CONTRAST



HISTORY:

PCA STROKE



COMPARISON:

None available. 



TECHNIQUE:

Multiplanar, multisequence MR images of the brain were obtained 

without intravenous contrast enhancement.



FINDINGS:



HEMORRHAGE:

None



DWI:

No evidence of an acute or early subacute infarction.



BRAIN PARENCHYMA:

Gray-white matter differentiation is preserved.  There is no mass, 

mass effect or abnormal extra-axial fluid collection.  There is no 

territorial infarction. The midline sagittal structures are normal.



VENTRICLES:

There is mild age advanced global parenchymal volume loss and 

proportionate enlargement of the ventricles and cortical sulci. There 

is a diana cisterna magna. 



CRANIUM:

There is normal bone marrow signal pattern. There is a 

well-circumscribed T1 hypo intense and T2 hyperintense 1.1 x 0.6 cm 

intra diploic lesion in the left parietal calvarium. 



ORBITS:

Grossly unremarkable.



PARANASAL SINUSES/MASTOIDS:

Predominantly clear.



VASCULAR SYSTEM:

There are normal signal voids in the larger intracranial arteries. 



OTHER FINDINGS:

None. 



IMPRESSION:

No acute intracranial abnormality.  Specifically, no evidence for 

acute infarction.



Mild age advanced global parenchymal volume loss.



1.1 x 0.6 cm nonspecific intra diploic lesion in the left parietal 

calvarium.  Correlation with CT scan may be helpful for further 

evaluation.

## 2018-12-23 ENCOUNTER — HOSPITAL ENCOUNTER (EMERGENCY)
Dept: HOSPITAL 31 - C.ER | Age: 38
Discharge: HOME | End: 2018-12-23
Payer: MEDICAID

## 2018-12-23 VITALS
TEMPERATURE: 98.4 F | OXYGEN SATURATION: 100 % | SYSTOLIC BLOOD PRESSURE: 111 MMHG | HEART RATE: 70 BPM | RESPIRATION RATE: 18 BRPM | DIASTOLIC BLOOD PRESSURE: 73 MMHG

## 2018-12-23 VITALS — BODY MASS INDEX: 27 KG/M2

## 2018-12-23 DIAGNOSIS — R10.30: Primary | ICD-10-CM

## 2018-12-23 LAB
ALBUMIN SERPL-MCNC: 4.1 G/DL (ref 3.5–5)
ALBUMIN/GLOB SERPL: 1.3 {RATIO} (ref 1–2.1)
ALT SERPL-CCNC: 54 U/L (ref 9–52)
AST SERPL-CCNC: 72 U/L (ref 14–36)
BASOPHILS # BLD AUTO: 0 K/UL (ref 0–0.2)
BASOPHILS NFR BLD: 0.8 % (ref 0–2)
BILIRUB UR-MCNC: NEGATIVE MG/DL
BUN SERPL-MCNC: 8 MG/DL (ref 7–17)
CALCIUM SERPL-MCNC: 8.9 MG/DL (ref 8.6–10.4)
EOSINOPHIL # BLD AUTO: 0.1 K/UL (ref 0–0.7)
EOSINOPHIL NFR BLD: 2.3 % (ref 0–4)
ERYTHROCYTE [DISTWIDTH] IN BLOOD BY AUTOMATED COUNT: 13.5 % (ref 11.5–14.5)
GFR NON-AFRICAN AMERICAN: > 60
GLUCOSE UR STRIP-MCNC: NORMAL MG/DL
HGB BLD-MCNC: 12.6 G/DL (ref 11–16)
LEUKOCYTE ESTERASE UR-ACNC: (no result) LEU/UL
LIPASE: 85 U/L (ref 23–300)
LYMPHOCYTES # BLD AUTO: 1.1 K/UL (ref 1–4.3)
LYMPHOCYTES NFR BLD AUTO: 25.1 % (ref 20–40)
MCH RBC QN AUTO: 29.3 PG (ref 27–31)
MCHC RBC AUTO-ENTMCNC: 33.6 G/DL (ref 33–37)
MCV RBC AUTO: 87.4 FL (ref 81–99)
MONOCYTES # BLD: 0.6 K/UL (ref 0–0.8)
MONOCYTES NFR BLD: 13.4 % (ref 0–10)
NEUTROPHILS # BLD: 2.6 K/UL (ref 1.8–7)
NEUTROPHILS NFR BLD AUTO: 58.4 % (ref 50–75)
NRBC BLD AUTO-RTO: 0.1 % (ref 0–2)
PH UR STRIP: 5 [PH] (ref 5–8)
PLATELET # BLD: 189 K/UL (ref 130–400)
PMV BLD AUTO: 10.2 FL (ref 7.2–11.7)
PROT UR STRIP-MCNC: NEGATIVE MG/DL
RBC # BLD AUTO: 4.3 MIL/UL (ref 3.8–5.2)
RBC # UR STRIP: (no result) /UL
SP GR UR STRIP: 1.02 (ref 1–1.03)
SQUAMOUS EPITHIAL: < 1 /HPF (ref 0–5)
UROBILINOGEN UR-MCNC: NORMAL MG/DL (ref 0.2–1)
WBC # BLD AUTO: 4.4 K/UL (ref 4.8–10.8)

## 2018-12-23 PROCEDURE — 83690 ASSAY OF LIPASE: CPT

## 2018-12-23 PROCEDURE — 87086 URINE CULTURE/COLONY COUNT: CPT

## 2018-12-23 PROCEDURE — 96375 TX/PRO/DX INJ NEW DRUG ADDON: CPT

## 2018-12-23 PROCEDURE — 81001 URINALYSIS AUTO W/SCOPE: CPT

## 2018-12-23 PROCEDURE — 96374 THER/PROPH/DIAG INJ IV PUSH: CPT

## 2018-12-23 PROCEDURE — 74177 CT ABD & PELVIS W/CONTRAST: CPT

## 2018-12-23 PROCEDURE — 80053 COMPREHEN METABOLIC PANEL: CPT

## 2018-12-23 PROCEDURE — 85025 COMPLETE CBC W/AUTO DIFF WBC: CPT

## 2018-12-23 PROCEDURE — 84703 CHORIONIC GONADOTROPIN ASSAY: CPT

## 2018-12-23 PROCEDURE — 99284 EMERGENCY DEPT VISIT MOD MDM: CPT

## 2018-12-23 NOTE — C.PDOC
History Of Present Illness





37 y/o female, with history of fibroma, presents to ED complaining of lower 

abdominal pain for the past month. Patient states pain has worsened since 

yesterday. Patient also complains of spotting and states she vomited a few 

times. Denies any vaginal discharge, nonbloody diarrhea, or other physical 

complaints. 





Time Seen by Provider: 12/23/18 13:31


Chief Complaint (Nursing): Abdominal Pain


History Per: Patient


History/Exam Limitations: no limitations


Onset/Duration Of Symptoms: Days


Current Symptoms Are (Timing): Still Present





Past Medical History


Reviewed: Historical Data, Nursing Documentation, Vital Signs


Vital Signs: 





                                Last Vital Signs











Temp  98.9 F   12/23/18 13:17


 


Pulse  81   12/23/18 13:17


 


Resp  20   12/23/18 13:17


 


BP  119/82   12/23/18 13:17


 


Pulse Ox  99   12/23/18 13:17














- Medical History


PMH: Asthma


Surgical History: Cholecystectomy


Family History: States: No Known Family Hx





- Social History


Hx Tobacco Use: No


Hx Alcohol Use: No


Hx Substance Use: No





- Immunization History


Hx Tetanus Toxoid Vaccination: No


Hx Influenza Vaccination: No


Hx Pneumococcal Vaccination: No





Review Of Systems


Except As Marked, All Systems Reviewed And Found Negative.


Constitutional: Negative for: Fever


Gastrointestinal: Positive for: Abdominal Pain (lower).  Negative for: Vomiting,

Diarrhea





Physical Exam





- Physical Exam


Appears: Non-toxic, No Acute Distress


Skin: Warm, Dry, No Rash


Head: Atraumatic, Normacephalic


Eye(s): bilateral: Normal Inspection, PERRL


Oral Mucosa: Moist


Neck: Normal ROM


Chest: Symmetrical


Cardiovascular: Rhythm Regular, No Murmur


Respiratory: Normal Breath Sounds, No Rales, No Rhonchi, No Wheezing


Gastrointestinal/Abdominal: Tenderness (diffuse lower abdominal tenderness), No 

Guarding, No Rebound


Extremity: No Pedal Edema


Extremity: Bilateral: Normal Color And Temperature, Normal ROM


Neurological/Psych: Oriented x3, Normal Speech





ED Course And Treatment





- Laboratory Results


Result Diagrams: 


                                 12/23/18 14:36





                                 12/23/18 14:36


O2 Sat by Pulse Oximetry: 99 (RA)


Pulse Ox Interpretation: Normal





- CT Scan/US


  ** CT abd/pel


Other Rad Studies (CT/US): Read By Radiologist, Radiology Report Reviewed


CT/US Interpretation: FINDINGS:  LOWER THORAX:  No infiltrate/effusion.  Mild 

circumferential mural thickening of the distal esophagus, nonspecific.  

Esophagitis versus esophageal neoplasm.  LIVER:  Unremarkable. No gross lesion 

or ductal dilatation.  GALLBLADDER AND BILE DUCTS:  Status post cholecystectomy.

 PANCREAS:  Unremarkable. No gross lesion or ductal dilatation.  SPLEEN:  

Unremarkable.  ADRENALS:  Unremarkable. No mass.  KIDNEYS AND URETERS:  

Unremarkable. No hydronephrosis. No solid mass.  VASCULATURE:  Unremarkable. No 

aortic aneurysm. No aortic atherosclerotic calcification or mural plaque 

present.  BOWEL:  No bowel obstruction.  Mild circumferential gastric antral 

mural thickening consistent with nonspecific antritis.  APPENDIX:  Normal 

appendix.  PERITONEUM:  Trace fluid in pelvis.  LYMPH NODES:  Unremarkable. No 

enlarged lymph nodes.  BLADDER:  Suboptimally distended.  No gross abnormality. 

REPRODUCTIVE:  Normal retroverted uterus.  BONES:  No acute fracture.  OTHER 

FINDINGS:  None.  IMPRESSION:  Minimal fluid in pelvis, nonspecific.  

Circumferential mural thickening of distal thoracic esophagus.  Esophagectomy 

versus neoplasm.  Circumferential mural thickening of gastric antrum, most 

likely antritis peer status post cholecystectomy.  No other significant 

abnormality.





Medical Decision Making


Medical Decision Making: 





Plan:


--CT abd/pel


--Bloodwork


--Toradol 30 mg IVP


--Xanax 05 mg PO


--Zofran 4 mg IVP 


--Urinalysis





On re-evaluation, patient is resting comfortably and tolerating PO. Patient has 

improved. 





Disposition


Counseled Patient/Family Regarding: Diagnosis, Need For Followup





- Disposition


Disposition: HOME/ ROUTINE


Disposition Time: 16:34


Condition: STABLE


Additional Instructions: 


Follow up tomorrow with yor PMD/GYN


Prescriptions: 


Naproxen [EC-Naprosyn] 500 mg PO BID #14 tablet.


Instructions:  Nausea and Vomiting, Adult, Acute Abdomen (Belly Pain), Adult 

(DC)


Forms:  [email or web address] (English), Gen Discharge Inst Maltese





- Clinical Impression


Clinical Impression: 


 Abdominal pain








- Scribe Statement


The provider has reviewed the documentation as recorded by the Brandon Naranjo





Provider Attestation: 





All medical record entries made by the Brandon were at my direction and 

personally dictated by me. I have reviewed the chart and agree that the record 

accurately reflects my personal performance of the history, physical exam, 

medical decision making, and the department course for this patient. I have also

personally directed, reviewed, and agree with the discharge instructions and 

disposition.

## 2018-12-23 NOTE — CT
Date of service: 



12/23/2018



PROCEDURE:  CT Abdomen and Pelvis with contrast



HISTORY:

abd pain



COMPARISON:

None.



TECHNIQUE:

Contrast dose: 100 mL Visipaque 320



Radiation dose:



Total exam DLP = 567.88 mGy-cm.



This CT exam was performed using one or more of the following dose 

reduction techniques: Automated exposure control, adjustment of the 

mA and/or kV according to patient size, and/or use of iterative 

reconstruction technique.



FINDINGS:



LOWER THORAX:

No infiltrate/effusion.  Mild circumferential mural thickening of the 

distal esophagus, nonspecific.  Esophagitis versus esophageal 

neoplasm. 



LIVER:

Unremarkable. No gross lesion or ductal dilatation. 



GALLBLADDER AND BILE DUCTS:

Status post cholecystectomy 



PANCREAS:

Unremarkable. No gross lesion or ductal dilatation.



SPLEEN:

Unremarkable. 



ADRENALS:

Unremarkable. No mass. 



KIDNEYS AND URETERS:

Unremarkable. No hydronephrosis. No solid mass. 



VASCULATURE:

Unremarkable. No aortic aneurysm. No aortic atherosclerotic 

calcification or mural plaque present.



BOWEL:

No bowel obstruction.  Mild circumferential gastric antral mural 

thickening consistent with nonspecific antritis.  



APPENDIX:

Normal appendix. 



PERITONEUM:

Trace fluid in pelvis 



LYMPH NODES:

Unremarkable. No enlarged lymph nodes. 



BLADDER:

Suboptimally distended.  No gross abnormality. 



REPRODUCTIVE:

Normal retroverted uterus. 



BONES:

No acute fracture. 



OTHER FINDINGS:

None.



IMPRESSION:

Minimal fluid in pelvis, nonspecific.  Circumferential mural 

thickening of distal thoracic esophagus.  Esophagectomy versus 

neoplasm.  Circumferential mural thickening of gastric antrum, most 

likely antritis peer status post cholecystectomy.  No other 

significant abnormality.

## 2019-04-02 ENCOUNTER — HOSPITAL ENCOUNTER (EMERGENCY)
Dept: HOSPITAL 31 - C.ER | Age: 39
Discharge: HOME | End: 2019-04-02
Payer: MEDICAID

## 2019-04-02 VITALS
DIASTOLIC BLOOD PRESSURE: 75 MMHG | OXYGEN SATURATION: 99 % | HEART RATE: 78 BPM | RESPIRATION RATE: 18 BRPM | SYSTOLIC BLOOD PRESSURE: 115 MMHG | TEMPERATURE: 98 F

## 2019-04-02 VITALS — BODY MASS INDEX: 25.7 KG/M2

## 2019-04-02 DIAGNOSIS — L03.316: Primary | ICD-10-CM

## 2019-04-02 LAB
ALBUMIN SERPL-MCNC: 5 G/DL (ref 3.5–5)
ALBUMIN/GLOB SERPL: 1.4 {RATIO} (ref 1–2.1)
ALT SERPL-CCNC: 22 U/L (ref 9–52)
AST SERPL-CCNC: 36 U/L (ref 14–36)
BASOPHILS # BLD AUTO: 0 K/UL (ref 0–0.2)
BASOPHILS NFR BLD: 0.6 % (ref 0–2)
BILIRUB UR-MCNC: NEGATIVE MG/DL
BUN SERPL-MCNC: 12 MG/DL (ref 7–17)
CALCIUM SERPL-MCNC: 9.9 MG/DL (ref 8.6–10.4)
EOSINOPHIL # BLD AUTO: 0.1 K/UL (ref 0–0.7)
EOSINOPHIL NFR BLD: 1.9 % (ref 0–4)
ERYTHROCYTE [DISTWIDTH] IN BLOOD BY AUTOMATED COUNT: 13.2 % (ref 11.5–14.5)
GFR NON-AFRICAN AMERICAN: > 60
GLUCOSE UR STRIP-MCNC: NORMAL MG/DL
HCG,QUALITATIVE URINE: NEGATIVE
HGB BLD-MCNC: 13.4 G/DL (ref 11–16)
LEUKOCYTE ESTERASE UR-ACNC: (no result) LEU/UL
LYMPHOCYTES # BLD AUTO: 1.6 K/UL (ref 1–4.3)
LYMPHOCYTES NFR BLD AUTO: 22.2 % (ref 20–40)
MCH RBC QN AUTO: 27.5 PG (ref 27–31)
MCHC RBC AUTO-ENTMCNC: 32.7 G/DL (ref 33–37)
MCV RBC AUTO: 84 FL (ref 81–99)
MONOCYTES # BLD: 0.4 K/UL (ref 0–0.8)
MONOCYTES NFR BLD: 6 % (ref 0–10)
NEUTROPHILS # BLD: 4.9 K/UL (ref 1.8–7)
NEUTROPHILS NFR BLD AUTO: 69.3 % (ref 50–75)
NRBC BLD AUTO-RTO: 0.1 % (ref 0–2)
PH UR STRIP: 6 [PH] (ref 5–8)
PLATELET # BLD: 230 K/UL (ref 130–400)
PMV BLD AUTO: 9.8 FL (ref 7.2–11.7)
PROT UR STRIP-MCNC: NEGATIVE MG/DL
RBC # BLD AUTO: 4.89 MIL/UL (ref 3.8–5.2)
RBC # UR STRIP: NEGATIVE /UL
SP GR UR STRIP: 1.01 (ref 1–1.03)
UROBILINOGEN UR-MCNC: NORMAL MG/DL (ref 0.2–1)
WBC # BLD AUTO: 7.1 K/UL (ref 4.8–10.8)

## 2019-04-02 PROCEDURE — 96365 THER/PROPH/DIAG IV INF INIT: CPT

## 2019-04-02 PROCEDURE — 81025 URINE PREGNANCY TEST: CPT

## 2019-04-02 PROCEDURE — 74177 CT ABD & PELVIS W/CONTRAST: CPT

## 2019-04-02 PROCEDURE — 85025 COMPLETE CBC W/AUTO DIFF WBC: CPT

## 2019-04-02 PROCEDURE — 84703 CHORIONIC GONADOTROPIN ASSAY: CPT

## 2019-04-02 PROCEDURE — 81001 URINALYSIS AUTO W/SCOPE: CPT

## 2019-04-02 PROCEDURE — 80053 COMPREHEN METABOLIC PANEL: CPT

## 2019-04-02 PROCEDURE — 99284 EMERGENCY DEPT VISIT MOD MDM: CPT

## 2019-04-02 NOTE — C.PDOC
History Of Present Illness


The patient is a 38 year old female who underwent an emergency hysterectomy 

while vacationing in the [place] in February 2019. Patient states she

has history of fibroids and had a vaginal hemorrhage at the time and underwent a

laparoscopic procedure. She notes no complications during or following the 

surgery. Patient was able to return to work, noted the sites of the incision 

were dry and well-healing, and had no fever or pain. Patient recently started 

noticing some redness and discomfort around her umbilical area. She reports the 

area is wet and is draining serosanguinous fluid. She noticed some blood on her 

clothes after coming home from work today, and presents to the ED for further 

evaluation. Patient denies abdominal pain, but reports pain around the incision 

sites. She denies fever, chills, nausea and vomiting. 


Time Seen by Provider: 04/02/19 16:26


Chief Complaint (Nursing): Abnormal Skin Integrity


History Per: Patient


History/Exam Limitations: no limitations


Onset/Duration Of Symptoms: Hrs


Current Symptoms Are (Timing): Still Present


Quality Of Symptoms: Painful, Draining


Additional History Per: Patient





Past Medical History


Reviewed: Historical Data, Nursing Documentation, Vital Signs


Vital Signs: 





                                Last Vital Signs











Temp  98.8 F   04/02/19 16:18


 


Pulse  85   04/02/19 16:18


 


Resp  17   04/02/19 16:18


 


BP  132/82   04/02/19 16:18


 


Pulse Ox  100   04/02/19 16:18














- Medical History


PMH: Asthma


Surgical History: Cholecystectomy


Other Surgeries: hysterectomy


Family History: States: Unknown Family Hx





- Social History


Hx Tobacco Use: No


Hx Alcohol Use: No


Hx Substance Use: No





- Immunization History


Hx Tetanus Toxoid Vaccination: No


Hx Influenza Vaccination: No


Hx Pneumococcal Vaccination: Yes





Review Of Systems


Constitutional: Negative for: Fever, Chills


Gastrointestinal: Negative for: Nausea, Vomiting, Abdominal Pain


Skin: Positive for: Other (serosanguinous fluid drainage s/p surgical procedure 

)





Physical Exam





- Physical Exam


Appears: Non-toxic, No Acute Distress, Other (afebrile )


Skin: Normal Color, Warm, Dry


Head: Atraumatic, Normacephalic


Eye(s): bilateral: Normal Inspection


Oral Mucosa: Moist


Neck: Supple


Chest: Symmetrical, No Deformity, No Tenderness


Cardiovascular: Rhythm Regular, No Murmur


Respiratory: Normal Breath Sounds, No Rales, No Rhonchi, No Wheezing


Gastrointestinal/Abdominal: Soft, No Tenderness, No Guarding, No Rebound, Other 

(induration noted to area underneath umbilicus, which is tender to palpation 

with possible fluctuance )


Extremity: Normal ROM, Capillary Refill (less than 2 seconds )


Neurological/Psych: Normal Speech, Normal Cognition





ED Course And Treatment





- Laboratory Results


Result Diagrams: 


                                 04/02/19 17:07





                                 04/02/19 17:07


Lab Results: 





                                        











Total Bilirubin  0.5 mg/dL (0.2-1.3)   04/02/19  17:07    


 


AST  36 U/L (14-36)  D 04/02/19  17:07    


 


ALT  22 U/L (9-52)   04/02/19  17:07    


 


Alkaline Phosphatase  78 U/L ()   04/02/19  17:07    


 


Total Protein  8.7 g/dL (6.3-8.3)  H  04/02/19  17:07    


 


Albumin  5.0 g/dL (3.5-5.0)  D 04/02/19  17:07    


 


Globulin  3.7 gm/dL (2.2-3.9)   04/02/19  17:07    


 


Albumin/Globulin Ratio  1.4  (1.0-2.1)   04/02/19  17:07    











O2 Sat by Pulse Oximetry: 100 (on RA )


Pulse Ox Interpretation: Normal





Medical Decision Making


Medical Decision Making: 





Progress: 


Bloodwork and urinalysis ordered.


CT A/P scan ordered to rule out abscess. 





Disposition





- Disposition


Referrals: 


[facility] [Outside]


Disposition: HOME/ ROUTINE


Disposition Time: 18:46


Condition: STABLE


Prescriptions: 


Clindamycin [Cleocin] 300 mg PO BID #14 cap


Instructions:  Cellulitis (Skin Infection), Adult (DC)


Forms:  [employer] [facility] (Brazilian), Gen Discharge Inst Brazilian





- POA


Present On Arrival: None





- Clinical Impression


Clinical Impression: 


 Cellulitis








- Scribe Statement


The provider has reviewed the documentation as recorded by the Scribe (Anna Arnold)


Provider Attestation: 








All medical record entries made by the Scribe were at my direction and 

personally dictated by me. I have reviewed the chart and agree that the record 

accurately reflects my personal performance of the history, physical exam, 

medical decision making, and the department course for this patient. I have also

personally directed, reviewed, and agree with the discharge instructions and 

disposition.

## 2019-04-02 NOTE — CT
Date of service: 04/02/2019



PROCEDURE:  CT Abdomen and Pelvis with contrast



HISTORY:

2 months post op, rule our abdominal abscess



COMPARISON:

CT abdomen and pelvis with IV contrast performed 12/23/18



TECHNIQUE:

Contrast dose: 100 mL Omnipaque 350 IV



Radiation dose:



Total exam DLP = 483.36 mGy-cm.



This CT exam was performed using one or more of the following dose 

reduction techniques: Automated exposure control, adjustment of the 

mA and/or kV according to patient size, and/or use of iterative 

reconstruction technique.



FINDINGS:



LOWER THORAX:

No visible consolidation, pleural effusion, or pneumothorax.



Mild circumferential thickening of the distal esophagus, nonspecific. 



LIVER:

Unremarkable. 



GALLBLADDER AND BILE DUCTS:

Cholecystectomy. 



PANCREAS:

Unremarkable. 



SPLEEN:

Unremarkable. 



ADRENALS:

Unremarkable. 



KIDNEYS AND URETERS:

The kidneys enhance symmetrically. No hydronephrosis or obstructing 

calculus identified. 



VASCULATURE:

No aortic aneurysm. No atherosclerotic calcification or mural plaque 

present.



BOWEL:

Stomach is nondistended right appears thick walled. 



Lack of oral contrast limits evaluation for bowel pathology.  Bowel 

loops appear within normal limits of caliber without evidence of 

obstruction.



APPENDIX:

The appendix appears within normal limits of caliber. No secondary 

signs of acute appendicitis.



PERITONEUM:

No significant free fluid.  No definite free air. 



LYMPH NODES:

No bulky adenopathy identified. 



BLADDER:

Mildly thick-walled urinary bladder may be exaggerated by under 

distension. 



REPRODUCTIVE:

Unremarkable. 



BONES:

No acute osseous abnormality is detected. 



OTHER FINDINGS:

None.



IMPRESSION:

Mild circumferential wall thickening of the distal esophagus.  

Nondistended stomach also appears thick walled.  Recommend clinical 

correlation and further evaluation with endoscopy if indicated. 



Mildly thick-walled urinary bladder may be exaggerated by under 

distension.  Recommend correlation with urinalysis. 



No drainable abdominal or pelvic fluid collection or abscess 

identified.

## 2021-07-30 NOTE — CP.PCM.PN
Subjective





- Date & Time of Evaluation


Date of Evaluation: 09/04/18


Time of Evaluation: 12:30





- Subjective


Subjective: 


clinically same





Objective





- Vital Signs/Intake and Output


Vital Signs (last 24 hours): 


 











Temp Pulse Resp BP Pulse Ox


 


 98.3 F   84   20   117/76   99 


 


 09/04/18 15:02  09/04/18 15:02  09/04/18 15:02  09/04/18 15:02  09/04/18 15:02











- Medications


Medications: 


 Current Medications





Albuterol/Ipratropium (Duoneb 3 Mg/0.5 Mg (3 Ml) Ud)  3 ml INH Q6 PRN


   PRN Reason: Shortness of Breath


Aspirin (Aspirin Chewable)  81 mg PO DAILY [facility]


   Last Admin: 09/04/18 13:16 Dose:  81 mg


Clopidogrel Bisulfate (Plavix)  75 mg PO DAILY [facility]


   Last Admin: 09/04/18 09:41 Dose:  75 mg


Enoxaparin Sodium (Lovenox)  40 mg SC DAILY [facility]


   Last Admin: 09/04/18 09:46 Dose:  40 mg


Famotidine (Pepcid)  20 mg PO BID [facility]


   Last Admin: 09/04/18 17:24 Dose:  20 mg


Lorazepam (Ativan)  1 mg IVP ONCE PRN


   PRN Reason: Anxiety


Naproxen (Anaprox)  275 mg PO BID PRN


   PRN Reason: pain


Ondansetron HCl (Zofran Tab)  4 mg PO BID PRN


   PRN Reason: Nausea/Vomiting


Rosuvastatin Calcium (Crestor)  2.5 mg PO HS [facility]











- Labs


Labs: 


 





 09/02/18 15:10 





 09/02/18 15:10 





 











PT  11.7 SECONDS (9.7-12.2)   09/02/18  15:10    


 


INR  1.1   09/02/18  15:10    


 


APTT  32 SECONDS (21-34)   09/02/18  15:10    














- Constitutional


Appears: Well





- Head Exam


Head Exam: ATRAUMATIC, NORMAL INSPECTION, NORMOCEPHALIC





- Eye Exam


Eye Exam: EOMI, Normal appearance, PERRL


Pupil Exam: NORMAL ACCOMODATION, PERRL





- ENT Exam


ENT Exam: Mucous Membranes Moist, Normal Exam





- Neck Exam


Neck Exam: Full ROM, Normal Inspection.  absent: Lymphadenopathy





- Respiratory Exam


Respiratory Exam: Decreased Breath Sounds





- Cardiovascular Exam


Cardiovascular Exam: REGULAR RHYTHM, +S1, +S2





- GI/Abdominal Exam


GI & Abdominal Exam: Soft, Diminished Bowel Sounds





- Rectal Exam


Rectal Exam: Deferred





Assessment and Plan





- Assessment and Plan (Free Text)


Plan: 


Oral numbness 


TIA? Transient Right Subcortical Dysfunction?


--Neurology consulted Dr. Bates - help appreciated 





Imaging: All results pending 


- Brain MRI


- ECHO with bubble study


- Carotid Dopplers


- EEG 





Labs:


- All WNL





Medications:


- ASA, Plavix, Crestor as stroke prophylaxis 





Chest pain R/O ACS


-- Cardiology consulted - Dr. Moeller help appreciated 





Management:


- EKGs, ROMIs - normal, negative to date 


- ECHO- pending 





Prophylactic Measures:


- GI PPX: Pepcid 


- DVT PPX: SCDs, Lovenox 


- PT Eval zithromax, protonix, none